# Patient Record
Sex: MALE | Race: WHITE
[De-identification: names, ages, dates, MRNs, and addresses within clinical notes are randomized per-mention and may not be internally consistent; named-entity substitution may affect disease eponyms.]

---

## 2018-05-24 PROBLEM — Z00.00 ENCOUNTER FOR PREVENTIVE HEALTH EXAMINATION: Status: ACTIVE | Noted: 2018-05-24

## 2022-04-07 ENCOUNTER — APPOINTMENT (OUTPATIENT)
Dept: ORTHOPEDIC SURGERY | Facility: CLINIC | Age: 67
End: 2022-04-07
Payer: MEDICARE

## 2022-04-07 VITALS — BODY MASS INDEX: 31.99 KG/M2 | WEIGHT: 216 LBS | HEIGHT: 69 IN

## 2022-04-07 VITALS — HEIGHT: 69 IN | BODY MASS INDEX: 31.99 KG/M2 | WEIGHT: 216 LBS

## 2022-04-07 PROCEDURE — 99213 OFFICE O/P EST LOW 20 MIN: CPT | Mod: 25

## 2022-04-07 PROCEDURE — 20612 ASPIRATE/INJ GANGLION CYST: CPT

## 2022-04-07 RX ORDER — MELOXICAM 15 MG/1
15 TABLET ORAL
Refills: 0 | Status: ACTIVE | COMMUNITY

## 2022-04-07 RX ORDER — ATORVASTATIN CALCIUM 40 MG/1
40 TABLET, FILM COATED ORAL
Refills: 0 | Status: ACTIVE | COMMUNITY

## 2022-04-07 RX ORDER — PANTOPRAZOLE SODIUM 40 MG/1
40 GRANULE, DELAYED RELEASE ORAL
Refills: 0 | Status: ACTIVE | COMMUNITY

## 2022-04-07 RX ORDER — CLOPIDOGREL BISULFATE 75 MG/1
75 TABLET, FILM COATED ORAL
Refills: 0 | Status: ACTIVE | COMMUNITY

## 2022-04-07 RX ORDER — LOSARTAN POTASSIUM 50 MG/1
50 TABLET, FILM COATED ORAL
Refills: 0 | Status: ACTIVE | COMMUNITY

## 2022-04-07 RX ORDER — GABAPENTIN 300 MG/1
300 CAPSULE ORAL
Refills: 0 | Status: ACTIVE | COMMUNITY

## 2022-04-07 RX ORDER — ALLOPURINOL 300 MG/1
300 TABLET ORAL
Refills: 0 | Status: ACTIVE | COMMUNITY

## 2022-04-07 NOTE — ASSESSMENT
[FreeTextEntry1] : I discussed surgery to remove since the first aspiration never helped and returned. \par

## 2022-04-07 NOTE — REASON FOR VISIT
[FreeTextEntry2] : Ganglion left 3rd toe.  Has had it drained once and came back.  Dr Cavazos referred him to me.  Hurts if banged against something.

## 2022-04-07 NOTE — PHYSICAL EXAM
[Left] : left foot and ankle [Moderate] : moderate swelling of toe(s) [3rd] : 3rd [FreeTextEntry3] : Ganglion Cyst Third dorsal aspect left foot.  No odor.  No drainage.  No sign of infection.

## 2022-04-07 NOTE — PROCEDURE
[Left] : of the left [___ cc    1%] : Lidocaine ~Vcc of 1%  [Ganglion cyst] : ganglion cyst [Previous OTC use and PT nontherapeutic] : patient has tried OTC's including aspirin, Ibuprofen, Aleve, etc or prescription NSAIDS, and/or exercises at home and/or physical therapy without satisfactory response [Patient had decreased mobility in the joint] : patient had decreased mobility in the joint [Risks, benefits, alternatives discussed / Verbal consent obtained] : the risks benefits, and alternatives have been discussed, and verbal consent was obtained [de-identified] : .5cc [de-identified] : Yellow Gelatinous

## 2022-04-07 NOTE — HISTORY OF PRESENT ILLNESS
[de-identified] : Patient is here today for the left foot. Patient saw Dr. Cavazos last week and was dx with a digital mucinous cyst of toe of left foot. He states he has had the cyst for about 1 year and it was aspirated about 1 yr ago which lead to it becoming more swollen.

## 2022-04-21 ENCOUNTER — APPOINTMENT (OUTPATIENT)
Dept: ORTHOPEDIC SURGERY | Facility: CLINIC | Age: 67
End: 2022-04-21
Payer: MEDICARE

## 2022-04-21 PROCEDURE — 99213 OFFICE O/P EST LOW 20 MIN: CPT

## 2022-04-21 NOTE — PHYSICAL EXAM
[3rd] : 3rd [Left] : left foot and ankle [Mild] : mild swelling of toe(s) [NL (40)] : plantar flexion 40 degrees [NL (20)] : eversion 20 degrees [5___] : [facility] 5[unfilled]/5 [2+] : posterior tibialis pulse: 2+ [FreeTextEntry3] : COMPLETE RESOLUTION OF CYST 3RD DIGIT.  [] : not mildly antalgic

## 2022-04-21 NOTE — PROCEDURE
[Left] : of the left [___ cc    1%] : Lidocaine ~Vcc of 1%  [Ganglion cyst] : ganglion cyst [Previous OTC use and PT nontherapeutic] : patient has tried OTC's including aspirin, Ibuprofen, Aleve, etc or prescription NSAIDS, and/or exercises at home and/or physical therapy without satisfactory response [Patient had decreased mobility in the joint] : patient had decreased mobility in the joint [Risks, benefits, alternatives discussed / Verbal consent obtained] : the risks benefits, and alternatives have been discussed, and verbal consent was obtained [de-identified] : .5cc [de-identified] : Yellow Gelatinous

## 2022-04-21 NOTE — HISTORY OF PRESENT ILLNESS
[de-identified] : Patient is here today for the left foot. Patient saw Dr. Cavazos last week and was dx with a digital mucinous cyst of toe of left foot. He states he has had the cyst for about 1 year and it was aspirated about 1 yr ago which lead to it becoming more swollen.

## 2022-04-21 NOTE — ASSESSMENT
[FreeTextEntry1] : NO NEED FOR SURGERY SINCE THE LESION APPEARS TO BE GONE. \par WRAP IT DAILY WITH COBAN.

## 2023-05-17 ENCOUNTER — APPOINTMENT (OUTPATIENT)
Dept: ORTHOPEDIC SURGERY | Facility: CLINIC | Age: 68
End: 2023-05-17
Payer: MEDICARE

## 2023-05-17 DIAGNOSIS — M67.472 GANGLION, LEFT ANKLE AND FOOT: ICD-10-CM

## 2023-05-17 PROCEDURE — 20612 ASPIRATE/INJ GANGLION CYST: CPT

## 2023-05-17 PROCEDURE — 99213 OFFICE O/P EST LOW 20 MIN: CPT | Mod: 25

## 2023-05-17 NOTE — PHYSICAL EXAM
[Left] : left foot and ankle [Mild] : mild swelling of toe(s) [3rd] : 3rd [NL (40)] : plantar flexion 40 degrees [NL (20)] : eversion 20 degrees [5___] : [facility]unfilled[facility]5 [2+] : posterior tibialis pulse: 2+ [] : not mildly antalgic [FreeTextEntry9] : GANGLION CYST DORSAL DIPJ 3RD DIGIT 1.2 CM X 1.2 CM  [FreeTextEntry3] : COMPLETE RESOLUTION OF CYST 3RD DIGIT.

## 2023-05-17 NOTE — HISTORY OF PRESENT ILLNESS
[de-identified] : Patient presents today for evaluation of Left foot pain. Patient states he has been treated for the problem before on 4/21/22 for ganglion cyst. He had the cyst drained but it has since returned.

## 2023-05-17 NOTE — ASSESSMENT
[FreeTextEntry1] : ASPIRATION OF 3RD DIGITAL GANGLION CYST LEFT FOOT.\par NSAID FOR PAIN\par TYLENOL FOR PAIN.\par CALL IF REDNESS AND SWELLING OCCUR.\par